# Patient Record
Sex: FEMALE | Race: WHITE | NOT HISPANIC OR LATINO | URBAN - METROPOLITAN AREA
[De-identification: names, ages, dates, MRNs, and addresses within clinical notes are randomized per-mention and may not be internally consistent; named-entity substitution may affect disease eponyms.]

---

## 2022-01-24 ENCOUNTER — LAB OUTSIDE AN ENCOUNTER (OUTPATIENT)
Dept: URBAN - METROPOLITAN AREA CLINIC 70 | Facility: CLINIC | Age: 47
End: 2022-01-24

## 2022-01-24 ENCOUNTER — OFFICE VISIT (OUTPATIENT)
Dept: URBAN - METROPOLITAN AREA CLINIC 70 | Facility: CLINIC | Age: 47
End: 2022-01-24
Payer: COMMERCIAL

## 2022-01-24 ENCOUNTER — WEB ENCOUNTER (OUTPATIENT)
Dept: URBAN - METROPOLITAN AREA CLINIC 70 | Facility: CLINIC | Age: 47
End: 2022-01-24

## 2022-01-24 DIAGNOSIS — A04.9 BACTERIAL INTESTINAL INFECTION, UNSPECIFIED: ICD-10-CM

## 2022-01-24 DIAGNOSIS — Z12.11 COLON CANCER SCREENING: ICD-10-CM

## 2022-01-24 DIAGNOSIS — R10.84 GENERALIZED ABDOMINAL PAIN: ICD-10-CM

## 2022-01-24 DIAGNOSIS — R19.7 DIARRHEA OF PRESUMED INFECTIOUS ORIGIN: ICD-10-CM

## 2022-01-24 DIAGNOSIS — K21.9 GASTROESOPHAGEAL REFLUX DISEASE, UNSPECIFIED WHETHER ESOPHAGITIS PRESENT: ICD-10-CM

## 2022-01-24 PROCEDURE — 99203 OFFICE O/P NEW LOW 30 MIN: CPT | Performed by: NURSE PRACTITIONER

## 2022-01-24 RX ORDER — METFORMIN HYDROCHLORIDE 500 MG/1
1 TABLET WITH A MEAL TABLET, FILM COATED ORAL ONCE A DAY
Status: ACTIVE | COMMUNITY

## 2022-01-24 RX ORDER — METRONIDAZOLE 500 MG/1
1 TABLET TABLET, FILM COATED ORAL THREE TIMES A DAY
Qty: 30 | OUTPATIENT
Start: 2022-01-24 | End: 2022-02-03

## 2022-01-24 RX ORDER — OMEPRAZOLE 20 MG/1
1 CAPSULE 30 MINUTES BEFORE MORNING MEAL CAPSULE, DELAYED RELEASE ORAL ONCE A DAY
Status: ACTIVE | COMMUNITY

## 2022-01-24 RX ORDER — OMEPRAZOLE 20 MG/1
1 CAPSULE 30 MINUTES BEFORE MORNING MEAL CAPSULE, DELAYED RELEASE ORAL ONCE A DAY
OUTPATIENT

## 2022-01-24 RX ORDER — SPIRONOLACTONE 50 MG/1
1 TABLET TABLET, FILM COATED ORAL ONCE A DAY
Status: ACTIVE | COMMUNITY

## 2022-01-24 RX ORDER — CIPROFLOXACIN HYDROCHLORIDE 500 MG/1
1 TABLET TABLET, FILM COATED ORAL
Qty: 20 | OUTPATIENT
Start: 2022-01-24 | End: 2022-02-03

## 2022-01-24 RX ORDER — CITALOPRAM 20 MG/1
1 TABLET TABLET, FILM COATED ORAL ONCE A DAY
Status: ACTIVE | COMMUNITY

## 2022-01-24 NOTE — HPI-TODAY'S VISIT:
Patient is a 45 y/o female who is previously known to our group for anemia in 2017 with undergoing workup with EGD with findings of irregular z-line and congested duodenal mucosa (bx negative), colonoscopy with findings of internal hemorrhoids, and pill camera showing small bowel AVMs with etiology thought to 2/2 heavy menses which has improved with taking birth control who presents today for evaluation of diarrhea. She reports baseline bowel habit of BM x 1/day with formed stool and ~ 6 months ago had an acute change in bowel habit to multiple BMs per day (on average 10/day) with loose watery nonblood stool. Admits to associated generalized abdominal discomfort described as cramping. No precipitating factors such as antibiotics, travel, or ill contacts. No NSAIDs. No Fhx of colon cancer. Takes daily PPI with GERD symptoms well controlled. Denies wt loss, N/V, fever, dysphagia, constipation, or signs of GI bleeding.

## 2022-02-02 ENCOUNTER — TELEPHONE ENCOUNTER (OUTPATIENT)
Dept: URBAN - METROPOLITAN AREA CLINIC 92 | Facility: CLINIC | Age: 47
End: 2022-02-02

## 2022-02-02 LAB
ADENOVIRUS F 40/41: NOT DETECTED
ASTROVIRUS: NOT DETECTED
C DIFFICILE TOXIN A/B: DETECTED
CAMPYLOBACTER: NOT DETECTED
CRYPTOSPORIDIUM: NOT DETECTED
CYCLOSPORA CAYETANENSIS: NOT DETECTED
E COLI O157: (no result)
ENDOMYSIAL ANTIBODY IGA: NEGATIVE
ENTAMOEBA HISTOLYTICA: NOT DETECTED
ENTEROAGGREGATIVE E COLI: NOT DETECTED
ENTEROPATHOGENIC E COLI: NOT DETECTED
ENTEROTOXIGENIC E COLI: NOT DETECTED
GIARDIA LAMBLIA: NOT DETECTED
IMMUNOGLOBULIN A, QN, SERUM: 261
NOROVIRUS GI/GII: NOT DETECTED
PANCREATIC ELASTASE, FECAL: >500
PLESIOMONAS SHIGELLOIDES: NOT DETECTED
ROTAVIRUS A: NOT DETECTED
SALMONELLA: NOT DETECTED
SAPOVIRUS: NOT DETECTED
SHIGA-TOXIN-PRODUCING E COLI: NOT DETECTED
SHIGELLA/ENTEROINVASIVE E COLI: NOT DETECTED
T-TRANSGLUTAMINASE (TTG) IGA: <2
T4,FREE(DIRECT): 0.94
TSH: 0.92
VIBRIO CHOLERAE: NOT DETECTED
VIBRIO: NOT DETECTED
YERSINIA ENTEROCOLITICA: NOT DETECTED

## 2022-02-02 RX ORDER — METFORMIN HYDROCHLORIDE 500 MG/1
1 TABLET WITH A MEAL TABLET, FILM COATED ORAL ONCE A DAY
Status: ACTIVE | COMMUNITY

## 2022-02-02 RX ORDER — SPIRONOLACTONE 50 MG/1
1 TABLET TABLET, FILM COATED ORAL ONCE A DAY
Status: ACTIVE | COMMUNITY

## 2022-02-02 RX ORDER — METRONIDAZOLE 500 MG/1
1 TABLET TABLET, FILM COATED ORAL THREE TIMES A DAY
Qty: 30 | Status: ACTIVE | COMMUNITY
Start: 2022-01-24 | End: 2022-02-03

## 2022-02-02 RX ORDER — CITALOPRAM 20 MG/1
1 TABLET TABLET, FILM COATED ORAL ONCE A DAY
Status: ACTIVE | COMMUNITY

## 2022-02-02 RX ORDER — CIPROFLOXACIN HYDROCHLORIDE 500 MG/1
1 TABLET TABLET, FILM COATED ORAL
Qty: 20 | Status: ACTIVE | COMMUNITY
Start: 2022-01-24 | End: 2022-02-03

## 2022-02-02 RX ORDER — OMEPRAZOLE 20 MG/1
1 CAPSULE 30 MINUTES BEFORE MORNING MEAL CAPSULE, DELAYED RELEASE ORAL ONCE A DAY
Status: ACTIVE | COMMUNITY

## 2022-02-02 RX ORDER — VANCOMYCIN HYDROCHLORIDE 250 MG/1
AS DIRECTED CAPSULE ORAL QID
Qty: 40 CAPSULE | Refills: 0 | OUTPATIENT
Start: 2022-02-02 | End: 2022-02-12

## 2022-02-07 ENCOUNTER — TELEPHONE ENCOUNTER (OUTPATIENT)
Dept: URBAN - METROPOLITAN AREA CLINIC 70 | Facility: CLINIC | Age: 47
End: 2022-02-07

## 2022-02-07 RX ORDER — FLUCONAZOLE 150 MG/1
1 TABLET TABLET ORAL
Qty: 3 TABLET | Refills: 0 | OUTPATIENT
Start: 2022-02-07 | End: 2022-02-14

## 2022-02-07 RX ORDER — VANCOMYCIN HYDROCHLORIDE 250 MG/1
AS DIRECTED CAPSULE ORAL QID
Qty: 40 CAPSULE | Refills: 0 | Status: ACTIVE | COMMUNITY
Start: 2022-02-02 | End: 2022-02-12

## 2022-02-07 RX ORDER — CITALOPRAM 20 MG/1
1 TABLET TABLET, FILM COATED ORAL ONCE A DAY
Status: ACTIVE | COMMUNITY

## 2022-02-07 RX ORDER — SPIRONOLACTONE 50 MG/1
1 TABLET TABLET, FILM COATED ORAL ONCE A DAY
Status: ACTIVE | COMMUNITY

## 2022-02-07 RX ORDER — METFORMIN HYDROCHLORIDE 500 MG/1
1 TABLET WITH A MEAL TABLET, FILM COATED ORAL ONCE A DAY
Status: ACTIVE | COMMUNITY

## 2022-02-07 RX ORDER — OMEPRAZOLE 20 MG/1
1 CAPSULE 30 MINUTES BEFORE MORNING MEAL CAPSULE, DELAYED RELEASE ORAL ONCE A DAY
Status: ACTIVE | COMMUNITY

## 2022-02-16 ENCOUNTER — OFFICE VISIT (OUTPATIENT)
Dept: URBAN - METROPOLITAN AREA CLINIC 88 | Facility: CLINIC | Age: 47
End: 2022-02-16

## 2022-02-28 ENCOUNTER — CLAIMS CREATED FROM THE CLAIM WINDOW (OUTPATIENT)
Dept: URBAN - METROPOLITAN AREA CLINIC 70 | Facility: CLINIC | Age: 47
End: 2022-02-28
Payer: COMMERCIAL

## 2022-02-28 ENCOUNTER — WEB ENCOUNTER (OUTPATIENT)
Dept: URBAN - METROPOLITAN AREA CLINIC 70 | Facility: CLINIC | Age: 47
End: 2022-02-28

## 2022-02-28 DIAGNOSIS — A04.72 C. DIFFICILE DIARRHEA: ICD-10-CM

## 2022-02-28 DIAGNOSIS — Z12.11 COLON CANCER SCREENING: ICD-10-CM

## 2022-02-28 DIAGNOSIS — K21.9 GASTROESOPHAGEAL REFLUX DISEASE, UNSPECIFIED WHETHER ESOPHAGITIS PRESENT: ICD-10-CM

## 2022-02-28 PROCEDURE — 99214 OFFICE O/P EST MOD 30 MIN: CPT | Performed by: NURSE PRACTITIONER

## 2022-02-28 RX ORDER — OMEPRAZOLE 20 MG/1
1 CAPSULE 30 MINUTES BEFORE MORNING MEAL CAPSULE, DELAYED RELEASE ORAL ONCE A DAY
OUTPATIENT

## 2022-02-28 RX ORDER — FLUCONAZOLE 100 MG/1
1 TABLET TABLET ORAL
Qty: 3 TABLET | Refills: 1 | OUTPATIENT
Start: 2022-02-28 | End: 2022-03-14

## 2022-02-28 RX ORDER — METFORMIN HYDROCHLORIDE 500 MG/1
1 TABLET WITH A MEAL TABLET, FILM COATED ORAL ONCE A DAY
Status: ACTIVE | COMMUNITY

## 2022-02-28 RX ORDER — CITALOPRAM 20 MG/1
1 TABLET TABLET, FILM COATED ORAL ONCE A DAY
Status: ACTIVE | COMMUNITY

## 2022-02-28 RX ORDER — VANCOMYCIN HYDROCHLORIDE 250 MG/1
AS DIRECTED CAPSULE ORAL
Qty: 82 CAPSULE | OUTPATIENT
Start: 2022-02-28 | End: 2022-04-29

## 2022-02-28 RX ORDER — OMEPRAZOLE 20 MG/1
1 CAPSULE 30 MINUTES BEFORE MORNING MEAL CAPSULE, DELAYED RELEASE ORAL ONCE A DAY
Status: ACTIVE | COMMUNITY

## 2022-02-28 RX ORDER — SPIRONOLACTONE 50 MG/1
1 TABLET TABLET, FILM COATED ORAL ONCE A DAY
Status: ACTIVE | COMMUNITY

## 2022-02-28 NOTE — HPI-TODAY'S VISIT:
OV 1/24/22: Patient is a 45 y/o female who is previously known to our group for anemia in 2017 with undergoing workup with EGD with findings of irregular z-line and congested duodenal mucosa (bx negative), colonoscopy with findings of internal hemorrhoids, and pill camera showing small bowel AVMs with etiology thought to 2/2 heavy menses which has improved with taking birth control who presents today for evaluation of diarrhea. She reports baseline bowel habit of BM x 1/day with formed stool and ~ 6 months ago had an acute change in bowel habit to multiple BMs per day (on average 10/day) with loose watery nonblood stool. Admits to associated generalized abdominal discomfort described as cramping. No precipitating factors such as antibiotics, travel, or ill contacts. No NSAIDs. No Fhx of colon cancer. Takes daily PPI with GERD symptoms well controlled. Denies wt loss, N/V, fever, dysphagia, constipation, or signs of GI bleeding. ------------------------------------------------------------------------------ Today 2/28/22: Patient presents today for follow up. Stool studies on 1/25/22 were positive for C-diff. Vancomycin 250mg QID x 10 days given. Diflucan also given for yeast infection that developed with antibiotics. She reports she does remember taking clindomycin for abscessed tooth prior to diarrhea developing. Diarrhea improved while taking vancomycin with BMs x 3/day with loose semi-formed stool but after being off vanco x 1 week diarrhea reoccured to previous severity with BMs x >10 with watery non-bloody stool. Has some associated cramping. Abdominal CT 1/27/22 was unremarkable. Denies fever, N/V, wt loss, constipation, or rectal bleeding.

## 2022-04-06 ENCOUNTER — TELEPHONE ENCOUNTER (OUTPATIENT)
Dept: URBAN - METROPOLITAN AREA CLINIC 70 | Facility: CLINIC | Age: 47
End: 2022-04-06

## 2022-04-06 RX ORDER — METFORMIN HYDROCHLORIDE 500 MG/1
1 TABLET WITH A MEAL TABLET, FILM COATED ORAL ONCE A DAY
Status: ACTIVE | COMMUNITY

## 2022-04-06 RX ORDER — FIDAXOMICIN 200 MG/1
1 TABLET TABLET, FILM COATED ORAL TWICE A DAY
Qty: 20 | OUTPATIENT
Start: 2022-04-06 | End: 2022-04-16

## 2022-04-06 RX ORDER — CITALOPRAM 20 MG/1
1 TABLET TABLET, FILM COATED ORAL ONCE A DAY
Status: ACTIVE | COMMUNITY

## 2022-04-06 RX ORDER — SPIRONOLACTONE 50 MG/1
1 TABLET TABLET, FILM COATED ORAL ONCE A DAY
Status: ACTIVE | COMMUNITY

## 2022-04-06 RX ORDER — VANCOMYCIN HYDROCHLORIDE 250 MG/1
AS DIRECTED CAPSULE ORAL
Qty: 82 CAPSULE | Status: ACTIVE | COMMUNITY
Start: 2022-02-28 | End: 2022-04-29

## 2022-04-06 RX ORDER — OMEPRAZOLE 20 MG/1
1 CAPSULE 30 MINUTES BEFORE MORNING MEAL CAPSULE, DELAYED RELEASE ORAL ONCE A DAY
Status: ACTIVE | COMMUNITY

## 2022-04-28 ENCOUNTER — OFFICE VISIT (OUTPATIENT)
Dept: URBAN - METROPOLITAN AREA CLINIC 70 | Facility: CLINIC | Age: 47
End: 2022-04-28
Payer: COMMERCIAL

## 2022-04-28 ENCOUNTER — WEB ENCOUNTER (OUTPATIENT)
Dept: URBAN - METROPOLITAN AREA CLINIC 70 | Facility: CLINIC | Age: 47
End: 2022-04-28

## 2022-04-28 VITALS
WEIGHT: 206.1 LBS | TEMPERATURE: 97.5 F | DIASTOLIC BLOOD PRESSURE: 92 MMHG | HEART RATE: 87 BPM | HEIGHT: 62 IN | SYSTOLIC BLOOD PRESSURE: 131 MMHG | BODY MASS INDEX: 37.93 KG/M2

## 2022-04-28 DIAGNOSIS — Z12.11 COLON CANCER SCREENING: ICD-10-CM

## 2022-04-28 DIAGNOSIS — K21.9 GASTROESOPHAGEAL REFLUX DISEASE, UNSPECIFIED WHETHER ESOPHAGITIS PRESENT: ICD-10-CM

## 2022-04-28 DIAGNOSIS — A04.72 C. DIFFICILE DIARRHEA: ICD-10-CM

## 2022-04-28 PROCEDURE — 99214 OFFICE O/P EST MOD 30 MIN: CPT | Performed by: NURSE PRACTITIONER

## 2022-04-28 RX ORDER — SPIRONOLACTONE 50 MG/1
1 TABLET TABLET, FILM COATED ORAL ONCE A DAY
Status: ACTIVE | COMMUNITY

## 2022-04-28 RX ORDER — METRONIDAZOLE 500 MG/1
1 TABLET TABLET, FILM COATED ORAL THREE TIMES A DAY
Qty: 42 TABLET | OUTPATIENT
Start: 2022-04-28 | End: 2022-05-12

## 2022-04-28 RX ORDER — OMEPRAZOLE 20 MG/1
1 CAPSULE 30 MINUTES BEFORE MORNING MEAL CAPSULE, DELAYED RELEASE ORAL ONCE A DAY
OUTPATIENT

## 2022-04-28 RX ORDER — OMEPRAZOLE 20 MG/1
1 CAPSULE 30 MINUTES BEFORE MORNING MEAL CAPSULE, DELAYED RELEASE ORAL ONCE A DAY
Status: ACTIVE | COMMUNITY

## 2022-04-28 RX ORDER — CITALOPRAM 20 MG/1
1 TABLET TABLET, FILM COATED ORAL ONCE A DAY
Status: ACTIVE | COMMUNITY

## 2022-04-28 RX ORDER — VANCOMYCIN HYDROCHLORIDE 250 MG/1
AS DIRECTED CAPSULE ORAL
Qty: 82 CAPSULE | Status: ACTIVE | COMMUNITY
Start: 2022-02-28 | End: 2022-04-29

## 2022-04-28 RX ORDER — METFORMIN HYDROCHLORIDE 500 MG/1
1 TABLET WITH A MEAL TABLET, FILM COATED ORAL ONCE A DAY
Status: ACTIVE | COMMUNITY

## 2022-04-28 NOTE — HPI-TODAY'S VISIT:
OV 1/24/22: Patient is a 47 y/o female who is previously known to our group for anemia in 2017 with undergoing workup with EGD with findings of irregular z-line and congested duodenal mucosa (bx negative), colonoscopy with findings of internal hemorrhoids, and pill camera showing small bowel AVMs with etiology thought to 2/2 heavy menses which has improved with taking birth control who presents today for evaluation of diarrhea. She reports baseline bowel habit of BM x 1/day with formed stool and ~ 6 months ago had an acute change in bowel habit to multiple BMs per day (on average 10/day) with loose watery nonblood stool. Admits to associated generalized abdominal discomfort described as cramping. No precipitating factors such as antibiotics, travel, or ill contacts. No NSAIDs. No Fhx of colon cancer. Takes daily PPI with GERD symptoms well controlled. Denies wt loss, N/V, fever, dysphagia, constipation, or signs of GI bleeding. ------------------------------------------------------------------------------ OV 2/28/22: Patient presents today for follow up. Stool studies on 1/25/22 were positive for C-diff. Vancomycin 250mg QID x 10 days given. Diflucan also given for yeast infection that developed with antibiotics. She reports she does remember taking clindomycin for abscessed tooth prior to diarrhea developing. Diarrhea improved while taking vancomycin with BMs x 3/day with loose semi-formed stool but after being off vanco x 1 week diarrhea reoccured to previous severity with BMs x >10 with watery non-bloody stool. Has some associated cramping. Abdominal CT 1/27/22 was unremarkable. Denies fever, N/V, wt loss, constipation, or rectal bleeding. ---------------------------------------------------------------------------- Today 4/28/22: Patient presents today for follow up. Since last OV, she was retreated with a long taper of vancomycin with reoccurance of diarrhea and was then given Dificin 200mg BID x 10 days. Today she reports diarrhea improves while on antibiotics with frequency of BMs decreasing to 2-3/day with still loose stool but then after stopping antibiotics diarrhea returns. Currently having more than 10 BMs per day. States "I'm up all night going to the bathroom" Has some occasional associated nausea. Denies fever, vomiting, wt loss, abdominal pain, constipation, or rectal bleeding.

## 2022-05-23 ENCOUNTER — OFFICE VISIT (OUTPATIENT)
Dept: URBAN - METROPOLITAN AREA CLINIC 70 | Facility: CLINIC | Age: 47
End: 2022-05-23
Payer: COMMERCIAL

## 2022-05-23 VITALS
TEMPERATURE: 98.1 F | SYSTOLIC BLOOD PRESSURE: 165 MMHG | HEIGHT: 62 IN | HEART RATE: 91 BPM | DIASTOLIC BLOOD PRESSURE: 116 MMHG | BODY MASS INDEX: 39.16 KG/M2 | WEIGHT: 212.8 LBS

## 2022-05-23 DIAGNOSIS — Z12.11 COLON CANCER SCREENING: ICD-10-CM

## 2022-05-23 DIAGNOSIS — K21.9 GASTROESOPHAGEAL REFLUX DISEASE, UNSPECIFIED WHETHER ESOPHAGITIS PRESENT: ICD-10-CM

## 2022-05-23 DIAGNOSIS — A04.72 C. DIFFICILE DIARRHEA: ICD-10-CM

## 2022-05-23 PROCEDURE — 99214 OFFICE O/P EST MOD 30 MIN: CPT | Performed by: NURSE PRACTITIONER

## 2022-05-23 RX ORDER — DICYCLOMINE HYDROCHLORIDE 10 MG/1
1 CAPSULE CAPSULE ORAL
Qty: 30 | Refills: 2 | OUTPATIENT
Start: 2022-05-23 | End: 2022-06-22

## 2022-05-23 RX ORDER — OMEPRAZOLE 20 MG/1
1 CAPSULE 30 MINUTES BEFORE MORNING MEAL CAPSULE, DELAYED RELEASE ORAL ONCE A DAY
Status: ACTIVE | COMMUNITY

## 2022-05-23 RX ORDER — OMEPRAZOLE 20 MG/1
1 CAPSULE 30 MINUTES BEFORE MORNING MEAL CAPSULE, DELAYED RELEASE ORAL ONCE A DAY
OUTPATIENT

## 2022-05-23 RX ORDER — METFORMIN HYDROCHLORIDE 500 MG/1
1 TABLET WITH A MEAL TABLET, FILM COATED ORAL ONCE A DAY
Status: ACTIVE | COMMUNITY

## 2022-05-23 RX ORDER — SPIRONOLACTONE 50 MG/1
1 TABLET TABLET, FILM COATED ORAL ONCE A DAY
Status: ACTIVE | COMMUNITY

## 2022-05-23 RX ORDER — CITALOPRAM 20 MG/1
1 TABLET TABLET, FILM COATED ORAL ONCE A DAY
Status: ACTIVE | COMMUNITY

## 2022-05-23 NOTE — HPI-TODAY'S VISIT:
OV 1/24/22: Patient is a 47 y/o female who is previously known to our group for anemia in 2017 with undergoing workup with EGD with findings of irregular z-line and congested duodenal mucosa (bx negative), colonoscopy with findings of internal hemorrhoids, and pill camera showing small bowel AVMs with etiology thought to 2/2 heavy menses which has improved with taking birth control who presents today for evaluation of diarrhea. She reports baseline bowel habit of BM x 1/day with formed stool and ~ 6 months ago had an acute change in bowel habit to multiple BMs per day (on average 10/day) with loose watery nonblood stool. Admits to associated generalized abdominal discomfort described as cramping. No precipitating factors such as antibiotics, travel, or ill contacts. No NSAIDs. No Fhx of colon cancer. Takes daily PPI with GERD symptoms well controlled. Denies wt loss, N/V, fever, dysphagia, constipation, or signs of GI bleeding. ------------------------------------------------------------------------------ OV 2/28/22: Patient presents today for follow up. Stool studies on 1/25/22 were positive for C-diff. Vancomycin 250mg QID x 10 days given. Diflucan also given for yeast infection that developed with antibiotics. She reports she does remember taking clindomycin for abscessed tooth prior to diarrhea developing. Diarrhea improved while taking vancomycin with BMs x 3/day with loose semi-formed stool but after being off vanco x 1 week diarrhea reoccured to previous severity with BMs x >10 with watery non-bloody stool. Has some associated cramping. Abdominal CT 1/27/22 was unremarkable. Denies fever, N/V, wt loss, constipation, or rectal bleeding. ---------------------------------------------------------------------------- OV 4/28/22: Patient presents today for follow up. Since last OV, she was retreated with a long taper of vancomycin with reoccurance of diarrhea and was then given Dificin 200mg BID x 10 days. Today she reports diarrhea improves while on antibiotics with frequency of BMs decreasing to 2-3/day with still loose stool but then after stopping antibiotics diarrhea returns. Currently having more than 10 BMs per day. States "I'm up all night going to the bathroom" Has some occasional associated nausea. Denies fever, vomiting, wt loss, abdominal pain, constipation, or rectal bleeding. --------------------------------------------------------------------------- Today 5/23/22: Patient presents today for follow up. She was given Flagyl at last OV for C-diff diarrhea. She reports once again while on antibiotic diarrhea improves and then reoccurs once antibiotic is completed. States she normally is having BM x 2-3/day with loose non-bloody stool but has had 7 BMs so far today. Has associated abdominal cramping. Denies fever, vomiting, wt loss, constipation, or rectal bleeding.

## 2022-06-02 ENCOUNTER — TELEPHONE ENCOUNTER (OUTPATIENT)
Dept: URBAN - METROPOLITAN AREA CLINIC 70 | Facility: CLINIC | Age: 47
End: 2022-06-02

## 2022-06-02 RX ORDER — CITALOPRAM 20 MG/1
1 TABLET TABLET, FILM COATED ORAL ONCE A DAY
Status: ACTIVE | COMMUNITY

## 2022-06-02 RX ORDER — OMEPRAZOLE 20 MG/1
1 CAPSULE 30 MINUTES BEFORE MORNING MEAL CAPSULE, DELAYED RELEASE ORAL ONCE A DAY
Status: ACTIVE | COMMUNITY

## 2022-06-02 RX ORDER — METFORMIN HYDROCHLORIDE 500 MG/1
1 TABLET WITH A MEAL TABLET, FILM COATED ORAL ONCE A DAY
Status: ACTIVE | COMMUNITY

## 2022-06-02 RX ORDER — DICYCLOMINE HYDROCHLORIDE 10 MG/1
1 CAPSULE CAPSULE ORAL
Qty: 30 | Refills: 2 | Status: ACTIVE | COMMUNITY
Start: 2022-05-23 | End: 2022-06-22

## 2022-06-02 RX ORDER — SPIRONOLACTONE 50 MG/1
1 TABLET TABLET, FILM COATED ORAL ONCE A DAY
Status: ACTIVE | COMMUNITY

## 2022-06-02 RX ORDER — CHOLESTYRAMINE 4 G/9G
1 SCOOP POWDER, FOR SUSPENSION ORAL TWICE A DAY
Qty: 240 GRAM | Refills: 2 | OUTPATIENT
Start: 2022-06-02

## 2022-06-03 LAB — C DIFFICILE TOXINS A+B, EIA: NEGATIVE

## 2022-06-20 ENCOUNTER — OFFICE VISIT (OUTPATIENT)
Dept: URBAN - METROPOLITAN AREA CLINIC 70 | Facility: CLINIC | Age: 47
End: 2022-06-20
Payer: COMMERCIAL

## 2022-06-20 ENCOUNTER — DASHBOARD ENCOUNTERS (OUTPATIENT)
Age: 47
End: 2022-06-20

## 2022-06-20 VITALS
HEIGHT: 62 IN | BODY MASS INDEX: 39.49 KG/M2 | SYSTOLIC BLOOD PRESSURE: 144 MMHG | DIASTOLIC BLOOD PRESSURE: 89 MMHG | HEART RATE: 96 BPM | WEIGHT: 214.6 LBS | TEMPERATURE: 97 F

## 2022-06-20 DIAGNOSIS — A04.72 C. DIFFICILE DIARRHEA: ICD-10-CM

## 2022-06-20 DIAGNOSIS — Z12.11 COLON CANCER SCREENING: ICD-10-CM

## 2022-06-20 DIAGNOSIS — K21.9 GASTROESOPHAGEAL REFLUX DISEASE, UNSPECIFIED WHETHER ESOPHAGITIS PRESENT: ICD-10-CM

## 2022-06-20 PROBLEM — 235595009: Status: ACTIVE | Noted: 2022-01-24

## 2022-06-20 PROCEDURE — 99214 OFFICE O/P EST MOD 30 MIN: CPT | Performed by: NURSE PRACTITIONER

## 2022-06-20 RX ORDER — DICYCLOMINE HYDROCHLORIDE 10 MG/1
1 CAPSULE CAPSULE ORAL
Qty: 30 | Refills: 2 | Status: ACTIVE | COMMUNITY
Start: 2022-05-23 | End: 2022-06-22

## 2022-06-20 RX ORDER — CITALOPRAM 20 MG/1
1 TABLET TABLET, FILM COATED ORAL ONCE A DAY
Status: ACTIVE | COMMUNITY

## 2022-06-20 RX ORDER — METFORMIN HYDROCHLORIDE 500 MG/1
1 TABLET WITH A MEAL TABLET, FILM COATED ORAL ONCE A DAY
Status: ACTIVE | COMMUNITY

## 2022-06-20 RX ORDER — OMEPRAZOLE 20 MG/1
1 CAPSULE 30 MINUTES BEFORE MORNING MEAL CAPSULE, DELAYED RELEASE ORAL ONCE A DAY
Status: ACTIVE | COMMUNITY

## 2022-06-20 RX ORDER — SPIRONOLACTONE 50 MG/1
1 TABLET TABLET, FILM COATED ORAL ONCE A DAY
Status: ACTIVE | COMMUNITY

## 2022-06-20 RX ORDER — CHOLESTYRAMINE 4 G/9G
1 SCOOP POWDER, FOR SUSPENSION ORAL TWICE A DAY
OUTPATIENT
Start: 2022-06-02

## 2022-06-20 RX ORDER — DICYCLOMINE HYDROCHLORIDE 10 MG/1
1 CAPSULE CAPSULE ORAL
OUTPATIENT
Start: 2022-05-23

## 2022-06-20 RX ORDER — CHOLESTYRAMINE 4 G/9G
1 SCOOP POWDER, FOR SUSPENSION ORAL TWICE A DAY
Qty: 240 GRAM | Refills: 2 | Status: ACTIVE | COMMUNITY
Start: 2022-06-02

## 2022-06-20 RX ORDER — OMEPRAZOLE 20 MG/1
1 CAPSULE 30 MINUTES BEFORE MORNING MEAL CAPSULE, DELAYED RELEASE ORAL ONCE A DAY
OUTPATIENT

## 2022-06-20 NOTE — HPI-TODAY'S VISIT:
OV 1/24/22: Patient is a 45 y/o female who is previously known to our group for anemia in 2017 with undergoing workup with EGD with findings of irregular z-line and congested duodenal mucosa (bx negative), colonoscopy with findings of internal hemorrhoids, and pill camera showing small bowel AVMs with etiology thought to 2/2 heavy menses which has improved with taking birth control who presents today for evaluation of diarrhea. She reports baseline bowel habit of BM x 1/day with formed stool and ~ 6 months ago had an acute change in bowel habit to multiple BMs per day (on average 10/day) with loose watery nonblood stool. Admits to associated generalized abdominal discomfort described as cramping. No precipitating factors such as antibiotics, travel, or ill contacts. No NSAIDs. No Fhx of colon cancer. Takes daily PPI with GERD symptoms well controlled. Denies wt loss, N/V, fever, dysphagia, constipation, or signs of GI bleeding. ------------------------------------------------------------------------------ OV 2/28/22: Patient presents today for follow up. Stool studies on 1/25/22 were positive for C-diff. Vancomycin 250mg QID x 10 days given. Diflucan also given for yeast infection that developed with antibiotics. She reports she does remember taking clindomycin for abscessed tooth prior to diarrhea developing. Diarrhea improved while taking vancomycin with BMs x 3/day with loose semi-formed stool but after being off vanco x 1 week diarrhea reoccured to previous severity with BMs x >10 with watery non-bloody stool. Has some associated cramping. Abdominal CT 1/27/22 was unremarkable. Denies fever, N/V, wt loss, constipation, or rectal bleeding. ---------------------------------------------------------------------------- OV 4/28/22: Patient presents today for follow up. Since last OV, she was retreated with a long taper of vancomycin with reoccurance of diarrhea and was then given Dificin 200mg BID x 10 days. Today she reports diarrhea improves while on antibiotics with frequency of BMs decreasing to 2-3/day with still loose stool but then after stopping antibiotics diarrhea returns. Currently having more than 10 BMs per day. States "I'm up all night going to the bathroom" Has some occasional associated nausea. Denies fever, vomiting, wt loss, abdominal pain, constipation, or rectal bleeding. --------------------------------------------------------------------------- OV 5/23/22: Patient presents today for follow up. She was given Flagyl at last OV for C-diff diarrhea. She reports once again while on antibiotic diarrhea improves and then reoccurs once antibiotic is completed. States she normally is having BM x 2-3/day with loose non-bloody stool but has had 7 BMs so far today. Has associated abdominal cramping. Denies fever, vomiting, wt loss, constipation, or rectal bleeding. -------------------------------------------------------------------------- Today 6/20/22: Patient presents today for follow up. Lab on 5/31/22 shows C diff toxin negative c/w no active infection of C-diff. Results discussed with patient. She was start on trial of cholestyramine since last OV with diarrhea now improved. No current or new GI complaints. Denies wt loss, abdominal pain, N/V, constipation, or signs of GI bleeding.